# Patient Record
Sex: MALE | Race: WHITE | NOT HISPANIC OR LATINO | Employment: OTHER | URBAN - NONMETROPOLITAN AREA
[De-identification: names, ages, dates, MRNs, and addresses within clinical notes are randomized per-mention and may not be internally consistent; named-entity substitution may affect disease eponyms.]

---

## 2018-03-01 ENCOUNTER — OFFICE VISIT CONVERTED (OUTPATIENT)
Dept: FAMILY MEDICINE CLINIC | Facility: CLINIC | Age: 58
End: 2018-03-01
Attending: NURSE PRACTITIONER

## 2018-03-19 ENCOUNTER — OFFICE VISIT CONVERTED (OUTPATIENT)
Dept: FAMILY MEDICINE CLINIC | Facility: CLINIC | Age: 58
End: 2018-03-19
Attending: NURSE PRACTITIONER

## 2018-03-19 ENCOUNTER — CONVERSION ENCOUNTER (OUTPATIENT)
Dept: FAMILY MEDICINE CLINIC | Facility: CLINIC | Age: 58
End: 2018-03-19

## 2018-03-27 ENCOUNTER — OFFICE VISIT CONVERTED (OUTPATIENT)
Dept: FAMILY MEDICINE CLINIC | Facility: CLINIC | Age: 58
End: 2018-03-27
Attending: NURSE PRACTITIONER

## 2018-05-30 ENCOUNTER — OFFICE VISIT CONVERTED (OUTPATIENT)
Dept: FAMILY MEDICINE CLINIC | Facility: CLINIC | Age: 58
End: 2018-05-30
Attending: NURSE PRACTITIONER

## 2018-09-11 ENCOUNTER — CONVERSION ENCOUNTER (OUTPATIENT)
Dept: FAMILY MEDICINE CLINIC | Facility: CLINIC | Age: 58
End: 2018-09-11

## 2018-09-11 ENCOUNTER — OFFICE VISIT CONVERTED (OUTPATIENT)
Dept: FAMILY MEDICINE CLINIC | Facility: CLINIC | Age: 58
End: 2018-09-11
Attending: NURSE PRACTITIONER

## 2018-10-09 ENCOUNTER — OFFICE VISIT CONVERTED (OUTPATIENT)
Dept: FAMILY MEDICINE CLINIC | Facility: CLINIC | Age: 58
End: 2018-10-09
Attending: NURSE PRACTITIONER

## 2019-04-02 ENCOUNTER — HOSPITAL ENCOUNTER (OUTPATIENT)
Dept: GENERAL RADIOLOGY | Facility: HOSPITAL | Age: 59
Discharge: HOME | End: 2019-04-02
Attending: FAMILY MEDICINE

## 2019-04-02 ENCOUNTER — OFFICE VISIT CONVERTED (OUTPATIENT)
Dept: FAMILY MEDICINE CLINIC | Facility: CLINIC | Age: 59
End: 2019-04-02
Attending: FAMILY MEDICINE

## 2019-05-28 ENCOUNTER — OFFICE VISIT CONVERTED (OUTPATIENT)
Dept: SURGERY | Facility: CLINIC | Age: 59
End: 2019-05-28
Attending: SURGERY

## 2019-06-03 ENCOUNTER — HOSPITAL ENCOUNTER (OUTPATIENT)
Dept: GASTROENTEROLOGY | Facility: HOSPITAL | Age: 59
Setting detail: HOSPITAL OUTPATIENT SURGERY
Discharge: HOME | End: 2019-06-03
Attending: SURGERY

## 2019-06-24 ENCOUNTER — OFFICE VISIT CONVERTED (OUTPATIENT)
Dept: SURGERY | Facility: CLINIC | Age: 59
End: 2019-06-24
Attending: SURGERY

## 2021-03-02 ENCOUNTER — OFFICE VISIT CONVERTED (OUTPATIENT)
Dept: FAMILY MEDICINE CLINIC | Facility: CLINIC | Age: 61
End: 2021-03-02
Attending: FAMILY MEDICINE

## 2021-05-14 VITALS
TEMPERATURE: 98 F | OXYGEN SATURATION: 98 % | BODY MASS INDEX: 31.59 KG/M2 | HEART RATE: 56 BPM | DIASTOLIC BLOOD PRESSURE: 81 MMHG | HEIGHT: 67 IN | SYSTOLIC BLOOD PRESSURE: 162 MMHG | RESPIRATION RATE: 18 BRPM | WEIGHT: 201.25 LBS

## 2021-05-14 NOTE — PROGRESS NOTES
Progress Note      Patient Name: Patric Villalobos   Patient ID: [id number]   Sex: Male   YOB: 1960    Primary Care Provider: Moises Lozoya DO   Referring Provider: Moises Lozoya DO    Visit Date: March 2, 2021    Provider: Moises Lozoya DO   Location: [facility]   Location Address: [address] [place] [address]  [id number]   Location Phone: [phone]          Chief Complaint  · Since December he has been having diarrhea off and on.   · Has been taking Immodium at times and helps but ends up having diarrhea again.   · Had constipation a few times but thinks it could have been from Immodium       History Of Present Illness  Patric Villalobos is a 60 year old /White male who presents for evaluation and treatment of:        Patient presents complaining of diarrhea that is more like just loose stools you interested in trying some therapy changes or may be just MiraLAX as that is not taking enough fiber like a certain done such as psyllium that may be the issue of not we could do more or do stool samples       Past Medical History  Disease Name Date Onset Notes   Arthritis --  --    Gout 06/10/2014 --    Heart Murmur 1975 --    Hyperlipemia --  --    Hypertension 06/10/2014 --    Hypertriglyceridemia 06/04/2015 --    Limb Swelling --  --    Lung disease --  --    Memory loss/Forgetfulness --  --    Night sweats --  --    Osteoarthritis 12/04/2014 --    Osteoarthritis (arthritis due to wear and tear of joints) 07/18/2016 --    Reflux --  --    Reflux Disease --  --    Right Pain: Knee-Resolved 08/03/2015 --    Seasonal allergies --  --    Tuberculosis or positive PPD test 1985 --          Past Surgical History  Procedure Name Date Notes   Colonoscopy 2014 --    Finger Surgery 1975 middle finger-tractor accident   Hand surgery, left --  --          Medication List  Name Date Started Instructions   ALLOPURINOL 100 MG TABS 100 Tablet 12/28/2020 TAKE ONE TABLET BY MOUTH 3  TIMES DAILY   Lipitor 20 mg oral tablet 09/10/2020 take 1 tablet (20 mg) by oral route once daily at bedtime for 90 days   METOPROLOL SUCC ER 25 25 Tablet 11/30/2020 TAKE ONE TABLET BY MOUTH ONCE DAILY   Vitamin D2 50,000 unit oral capsule 04/02/2019 take 1 capsule (50,000 unit) by oral route once weekly for 30 days         Allergy List  Allergen Name Date Reaction Notes   NO KNOWN DRUG ALLERGIES --  --  --        Allergies Reconciled  Family Medical History  Disease Name Relative/Age Notes   Lung Neoplasm, Malignant Mother/74   --    Cancer, Unspecified Mother/   Mother   Diabetes, unspecified type Brother/  Sister/   Sister; Brother   - No Family History of Colorectal Cancer  --    -Father's Family History Unknown  --    -Mother's Family History Unknown  --          Social History  Finding Status Start/Stop Quantity Notes   Alcohol Current every day --/-- 18-12 oz. beers and wine, consumes 4-5 beers per day   Denies illicit substance abuse --  --/-- --  --    . --  --/-- --  --    Heavy Amount of Exercise (4 or more times weekly) --  --/-- --  --    lives alone --  --/-- --  --    Recreational Drug Use Never --/-- --  no   Tobacco Former --/-- --  former smoker   Working --  --/-- --  --          Immunizations  NameDate Admin Mfg Trade Name Lot Number Route Inj VIS Given VIS Publication   [facility]d10/09/2018 [id number] 10/09/2018 02/24/2015   Comments: NDC# [id number]. Patient tolerated the injection well with no reaction after a 15 min. wait.         Review of Systems  · Constitutional  o Denies  o : fever, fatigue, weight loss, weight gain  · HENT  o Denies  o : sinus pain, nasal congestion, nasal discharge, postnasal drip  · Cardiovascular  o Denies  o : lower extremity edema, claudication, chest pressure, palpitations  · Respiratory  o Denies  o : shortness of breath, wheezing, cough, hemoptysis, dyspnea on exertion  · Gastrointestinal  o Admits  o : diarrhea  o Denies  o : nausea, vomiting,  "constipation, abdominal pain  · Integument  o Denies  o : rash, itching, skin dryness  · Psychiatric  o Denies  o : anxiety, depression      Vitals  Date Time BP Position Site L\R Cuff Size HR RR TEMP (F) WT  HT  BMI kg/m2 BSA m2 O2 Sat FR L/min FiO2 HC       03/02/2021 04:24 /81 Sitting    56 - R 18 98 201lbs 4oz 5'  7.5\" 31.05 2.09 98 %  21%    03/02/2021 04:26 /78 Sitting                       Physical Examination  · Constitutional  o Appearance  o : no acute distress, well-nourished  · Head and Face  o Head  o :   § Inspection  § : atraumatic, normocephalic  · Ears, Nose, Mouth and Throat  o Ears  o :   § External Ears  § : normal  o Nose  o :   § Intranasal Exam  § : nares patent  o Oral Cavity  o :   § Oral Mucosa  § : moist mucous membranes  · Respiratory  o Respiratory Effort  o : breathing comfortably, symmetric chest rise  o Auscultation of Lungs  o : clear to asculatation bilaterally, no wheezes, rales, or rhonchii  · Cardiovascular  o Heart  o :   § Auscultation of Heart  § : regular rate and rhythm, no murmurs, rubs, or gallops  o Peripheral Vascular System  o :   § Extremities  § : no edema  · Neurologic  o Mental Status Examination  o :   § Orientation  § : grossly oriented to person, place and time  o Gait and Station  o :   § Gait Screening  § : normal gait  · Psychiatric  o General  o : normal mood and affect          Assessment  · Diarrhea     787.91/R19.7  · Hypertension     401.9/I10         Diarrhea  Loose stool  Advised to take psyllium Metamucil daily see if improved follow-up later in a week if not and consider diet changes handouts provided    Elevated blood pressure today as well recommend following up if not better at home which is usually the case and denies any chest pain palpitations headache fever or other     Problems Reconciled  Plan  · Orders  o ACO-39: Current medications updated and reviewed (1159F, ) - 401.9/I10, 787.91/R19.7 - " 03/02/2021  · Medications  o Medications have been Reconciled  o Transition of Care or Provider Policy  · Instructions  o Patient is taking medications as prescribed and doing well.   o Take all medications as prescribed/directed.  o Patient was educated/instructed on their diagnosis, treatment and medications prior to discharge from the clinic today.  o Risks, benefits, and alternatives were discussed with the patient. The patient is aware of risks associated with:  o Chronic conditions reviewed and taken into consideration for today's treatment plan.  o Electronically Identified Patient Education Materials Provided Electronically  · Disposition  o Call or Return if symptoms worsen or persist.  o as scheduled  o Follow up later in week if no better            Electronically Signed by: Mioses Lozoya DO -Author on March 7, 2021 04:23:29 PM

## 2021-05-15 VITALS
WEIGHT: 214 LBS | HEART RATE: 84 BPM | DIASTOLIC BLOOD PRESSURE: 76 MMHG | BODY MASS INDEX: 33.59 KG/M2 | SYSTOLIC BLOOD PRESSURE: 143 MMHG | OXYGEN SATURATION: 98 % | RESPIRATION RATE: 20 BRPM | TEMPERATURE: 97.1 F | HEIGHT: 67 IN

## 2021-05-15 VITALS — WEIGHT: 210 LBS | RESPIRATION RATE: 14 BRPM | BODY MASS INDEX: 32.96 KG/M2 | HEIGHT: 67 IN

## 2021-05-15 VITALS — WEIGHT: 211 LBS | RESPIRATION RATE: 14 BRPM | BODY MASS INDEX: 33.12 KG/M2 | HEIGHT: 67 IN

## 2021-05-16 VITALS
HEART RATE: 84 BPM | BODY MASS INDEX: 31.48 KG/M2 | RESPIRATION RATE: 20 BRPM | WEIGHT: 200.56 LBS | OXYGEN SATURATION: 98 % | DIASTOLIC BLOOD PRESSURE: 95 MMHG | TEMPERATURE: 98 F | HEIGHT: 67 IN | SYSTOLIC BLOOD PRESSURE: 138 MMHG

## 2021-05-16 VITALS
TEMPERATURE: 98.2 F | HEART RATE: 81 BPM | WEIGHT: 205.44 LBS | OXYGEN SATURATION: 95 % | BODY MASS INDEX: 32.25 KG/M2 | HEIGHT: 67 IN | SYSTOLIC BLOOD PRESSURE: 133 MMHG | RESPIRATION RATE: 20 BRPM | DIASTOLIC BLOOD PRESSURE: 73 MMHG

## 2021-05-16 VITALS
OXYGEN SATURATION: 95 % | TEMPERATURE: 96.2 F | WEIGHT: 205.25 LBS | DIASTOLIC BLOOD PRESSURE: 75 MMHG | SYSTOLIC BLOOD PRESSURE: 136 MMHG | RESPIRATION RATE: 20 BRPM | HEART RATE: 84 BPM

## 2021-05-16 VITALS
WEIGHT: 203.25 LBS | DIASTOLIC BLOOD PRESSURE: 76 MMHG | BODY MASS INDEX: 31.9 KG/M2 | RESPIRATION RATE: 20 BRPM | SYSTOLIC BLOOD PRESSURE: 126 MMHG | HEART RATE: 72 BPM | TEMPERATURE: 97.2 F | HEIGHT: 67 IN | OXYGEN SATURATION: 98 %

## 2021-05-16 VITALS
BODY MASS INDEX: 23.19 KG/M2 | OXYGEN SATURATION: 96 % | TEMPERATURE: 97.8 F | WEIGHT: 147.75 LBS | DIASTOLIC BLOOD PRESSURE: 92 MMHG | RESPIRATION RATE: 20 BRPM | HEART RATE: 78 BPM | SYSTOLIC BLOOD PRESSURE: 142 MMHG | HEIGHT: 67 IN

## 2021-05-16 VITALS
TEMPERATURE: 97.6 F | WEIGHT: 202.56 LBS | OXYGEN SATURATION: 98 % | SYSTOLIC BLOOD PRESSURE: 147 MMHG | HEART RATE: 74 BPM | BODY MASS INDEX: 31.79 KG/M2 | RESPIRATION RATE: 20 BRPM | HEIGHT: 67 IN | DIASTOLIC BLOOD PRESSURE: 88 MMHG

## 2022-08-02 ENCOUNTER — OFFICE VISIT (OUTPATIENT)
Dept: NEUROLOGY | Facility: CLINIC | Age: 62
End: 2022-08-02

## 2022-08-02 VITALS
BODY MASS INDEX: 30.28 KG/M2 | HEART RATE: 89 BPM | HEIGHT: 68 IN | DIASTOLIC BLOOD PRESSURE: 74 MMHG | WEIGHT: 199.8 LBS | SYSTOLIC BLOOD PRESSURE: 142 MMHG

## 2022-08-02 DIAGNOSIS — R20.2 PARESTHESIA: Primary | ICD-10-CM

## 2022-08-02 DIAGNOSIS — M79.644 FINGER PAIN, RIGHT: ICD-10-CM

## 2022-08-02 PROCEDURE — 99204 OFFICE O/P NEW MOD 45 MIN: CPT | Performed by: NURSE PRACTITIONER

## 2022-08-02 RX ORDER — LOSARTAN POTASSIUM 25 MG/1
25 TABLET ORAL DAILY
COMMUNITY

## 2022-08-02 RX ORDER — TRAZODONE HYDROCHLORIDE 50 MG/1
50 TABLET ORAL DAILY
COMMUNITY

## 2022-08-02 RX ORDER — ATORVASTATIN CALCIUM 40 MG/1
40 TABLET, FILM COATED ORAL DAILY
COMMUNITY

## 2022-08-02 RX ORDER — ASPIRIN 81 MG/1
81 TABLET ORAL DAILY
COMMUNITY

## 2022-08-02 RX ORDER — ALLOPURINOL 100 MG/1
100 TABLET ORAL DAILY
COMMUNITY
Start: 2021-04-01

## 2022-08-02 NOTE — PROGRESS NOTES
"Chief Complaint  Numbness    Subjective          Patric Villalobos presents to [facility]OLOGY [facility]UR[facility]URGERY  States he's been having paresthesia and burning in the right first digit.  Radiates up the forearm some.  States he is a  by trade and has done that for 35-40 years.  States that pain is concentrated in knuckle area of first digit.        Objective   Vital Signs:   /74   Pulse 89   Ht 171.5 cm (67.5\")   Wt 90.6 kg (199 lb 12.8 oz)   BMI 30.83 kg/m²     Physical Exam  Neurological:      Mental Status: He is oriented to person, place, and time.      Gait: Gait is intact.      Deep Tendon Reflexes: Strength normal.      Reflex Scores:       Bicep reflexes are 2+ on the right side and 2+ on the left side.       Brachioradialis reflexes are 2+ on the right side and 2+ on the left side.       Patellar reflexes are 2+ on the right side and 2+ on the left side.       Neurologic Exam     Mental Status   Oriented to person, place, and time.     Cranial Nerves   Cranial nerves II through XII intact.     Motor Exam   Muscle bulk: normal    Strength   Strength 5/5 throughout.     Sensory Exam   Decreased pinprick to right second and third digits.      Gait, Coordination, and Reflexes     Gait  Gait: normal    Reflexes   Right brachioradialis: 2+  Left brachioradialis: 2+  Right biceps: 2+  Left biceps: 2+  Right patellar: 2+  Left patellar: 2+       Result Review :               Assessment and Plan    Diagnoses and all orders for this visit:    1. Paresthesia (Primary)  -     XR Finger 2+ View Right; Future  -     EMG & Nerve Conduction Test; Future    2. Finger pain, right  Assessment & Plan:  Will order xray and EMG to further evaluate pain in right second digit that radiates to the forearm.  Differential diagnosis includes arthritis and right carpal tunnel syndrome.      Orders:  -     XR Finger 2+ View Right; Future  -     EMG & Nerve Conduction Test; Future      Follow Up "   Return in about 3 months (around 11/2/2022).  Patient was given instructions and counseling regarding his condition or for health maintenance advice. Please see specific information pulled into the AVS if appropriate.

## 2022-08-03 PROBLEM — M79.644 FINGER PAIN, RIGHT: Status: ACTIVE | Noted: 2022-08-03

## 2022-08-03 PROBLEM — R20.2 PARESTHESIA: Status: ACTIVE | Noted: 2022-08-03

## 2022-08-03 NOTE — ASSESSMENT & PLAN NOTE
Will order xray and EMG to further evaluate pain in right second digit that radiates to the forearm.  Differential diagnosis includes arthritis and right carpal tunnel syndrome.

## 2022-10-12 ENCOUNTER — HOSPITAL ENCOUNTER (OUTPATIENT)
Dept: GENERAL RADIOLOGY | Facility: HOSPITAL | Age: 62
Discharge: HOME | End: 2022-10-12
Admitting: NURSE PRACTITIONER

## 2022-10-12 DIAGNOSIS — M79.644 FINGER PAIN, RIGHT: ICD-10-CM

## 2022-10-12 DIAGNOSIS — R20.2 PARESTHESIA: ICD-10-CM

## 2022-10-12 PROCEDURE — 73140 X-RAY EXAM OF FINGER(S): CPT

## 2022-10-24 ENCOUNTER — PROCEDURE VISIT (OUTPATIENT)
Dept: NEUROLOGY | Facility: CLINIC | Age: 62
End: 2022-10-24

## 2022-10-24 VITALS
HEIGHT: 68 IN | SYSTOLIC BLOOD PRESSURE: 166 MMHG | HEART RATE: 50 BPM | DIASTOLIC BLOOD PRESSURE: 87 MMHG | BODY MASS INDEX: 29.33 KG/M2 | WEIGHT: 193.5 LBS

## 2022-10-24 DIAGNOSIS — M79.644 FINGER PAIN, RIGHT: ICD-10-CM

## 2022-10-24 DIAGNOSIS — R20.2 PARESTHESIA: ICD-10-CM

## 2022-10-24 DIAGNOSIS — G56.03 BILATERAL CARPAL TUNNEL SYNDROME: Primary | ICD-10-CM

## 2022-10-24 PROCEDURE — 95910 NRV CNDJ TEST 7-8 STUDIES: CPT | Performed by: PSYCHIATRY & NEUROLOGY

## 2022-10-24 PROCEDURE — 99202 OFFICE O/P NEW SF 15 MIN: CPT | Performed by: PSYCHIATRY & NEUROLOGY

## 2022-10-24 RX ORDER — ATORVASTATIN CALCIUM 20 MG/1
20 TABLET, FILM COATED ORAL DAILY
COMMUNITY
Start: 2022-07-18

## 2022-10-24 RX ORDER — LOSARTAN POTASSIUM 50 MG/1
50 TABLET ORAL DAILY
COMMUNITY

## 2022-10-24 NOTE — PROGRESS NOTES
"Chief Complaint  No chief complaint on file.    Subjective          Patric Villalobos is a 62 y.o. male who presents to [facility]UROLOGY & NEUROSURGERY  History of Present Illness  60-year-old man evaluated for pain in his right index finger in the metacarpophalangeal joint as well as the phalangeal joint.  It happens about 4 times a week and it depends on what he is caring.  He states that carrying a 50 to 60 pound 5 gallon paint container makes it worse.  He states that he does not get the numbness in the forearm medially unless there is pain in his index finger joints.  It can last for 20 minutes at a time.  He does not have any numbness and tingling in his hands when he wakes up in the morning, do activities of daily living such as driving, holding his phone or when he is working such as painting.  Objective   Vital Signs:   /87   Pulse 50   Ht 171.5 cm (67.5\")   Wt 87.8 kg (193 lb 8 oz)   BMI 29.86 kg/m²     Physical Exam   There is no weakness of the upper extremity on individual muscle testing.  Reflexes are symmetrical in the biceps, triceps, brachioradialis.  Phalen sign is negative.        Assessment and Plan  Diagnoses and all orders for this visit:    1. Bilateral carpal tunnel syndrome (Primary)  Assessment & Plan:  Nerve conduction study is abnormal and shows electrophysiologic evidence for mild bilateral carpal tunnel syndrome.  Clinically he is asymptomatic.  I discussed with him that his index finger joint pain is most likely secondary to osteoarthritis.  The numbness in the forearm medially is most likely referred symptomatology.      2. Paresthesia  -     EMG & Nerve Conduction Test    3. Finger pain, right  Assessment & Plan:  The joint pain in his index finger involving the metacarpophalangeal joint as well as the phalangeal joint is most likely secondary to osteoarthritis.    Orders:  -     EMG & Nerve Conduction Test       Nerve Conduction Study:  7 nerves "     EMG:  Not done    Total time spent with the patient and coordinating patient care was 15 minutes.    Follow Up  No follow-ups on file.  Patient was given instructions and counseling regarding his condition or for health maintenance advice. Please see specific information pulled into the AVS if appropriate.

## 2022-10-24 NOTE — ASSESSMENT & PLAN NOTE
Nerve conduction study is abnormal and shows electrophysiologic evidence for mild bilateral carpal tunnel syndrome.  Clinically he is asymptomatic.  I discussed with him that his index finger joint pain is most likely secondary to osteoarthritis.  The numbness in the forearm medially is most likely referred symptomatology.

## 2022-10-24 NOTE — ASSESSMENT & PLAN NOTE
The joint pain in his index finger involving the metacarpophalangeal joint as well as the phalangeal joint is most likely secondary to osteoarthritis.

## 2022-11-03 ENCOUNTER — OFFICE VISIT (OUTPATIENT)
Dept: NEUROLOGY | Facility: CLINIC | Age: 62
End: 2022-11-03

## 2022-11-03 VITALS
WEIGHT: 191.6 LBS | DIASTOLIC BLOOD PRESSURE: 83 MMHG | SYSTOLIC BLOOD PRESSURE: 166 MMHG | BODY MASS INDEX: 29.04 KG/M2 | HEIGHT: 68 IN | HEART RATE: 54 BPM

## 2022-11-03 DIAGNOSIS — M19.041 OSTEOARTHRITIS OF RIGHT HAND, UNSPECIFIED OSTEOARTHRITIS TYPE: Primary | ICD-10-CM

## 2022-11-03 PROCEDURE — 99214 OFFICE O/P EST MOD 30 MIN: CPT | Performed by: NURSE PRACTITIONER

## 2022-11-07 PROBLEM — M19.041 OSTEOARTHRITIS OF RIGHT HAND: Status: ACTIVE | Noted: 2022-11-07

## 2022-11-07 NOTE — ASSESSMENT & PLAN NOTE
EMG normal.  Symptoms likely secondary to osteoarthritis.  Recommended OTC topicals for pain and to discuss further with PCP.

## 2022-11-07 NOTE — PROGRESS NOTES
"Chief Complaint  Numbness    Subjective          Patric Villalobos presents to [facility] NEUROLOGY & NEUROSURGERY  History of Present Illness  Following up for EMG results.  Continuing to experience pain and burning in right first digit.     Interval History:   States he's been having paresthesia and burning in the right first digit.  Radiates up the forearm some.  States he is a  by trade and has done that for 35-40 years.  States that pain is concentrated in knuckle area of first digit.        Objective   Vital Signs:   /83   Pulse 54   Ht 171.5 cm (67.5\")   Wt 86.9 kg (191 lb 9.6 oz)   BMI 29.57 kg/m²     Physical Exam  Neurological:      Mental Status: He is oriented to person, place, and time.      Cranial Nerves: Cranial nerves 2-12 are intact.      Motor: Motor strength is normal.      Gait: Gait is intact.      Deep Tendon Reflexes:      Reflex Scores:       Bicep reflexes are 2+ on the right side and 2+ on the left side.       Brachioradialis reflexes are 2+ on the right side and 2+ on the left side.       Patellar reflexes are 2+ on the right side and 2+ on the left side.       Neurologic Exam     Mental Status   Oriented to person, place, and time.     Cranial Nerves   Cranial nerves II through XII intact.     Motor Exam   Muscle bulk: normal    Strength   Strength 5/5 throughout.     Sensory Exam   Decreased pinprick to right second and third digits.      Gait, Coordination, and Reflexes     Gait  Gait: normal    Reflexes   Right brachioradialis: 2+  Left brachioradialis: 2+  Right biceps: 2+  Left biceps: 2+  Right patellar: 2+  Left patellar: 2+       Result Review :             EMG: Normal     Assessment and Plan    Diagnoses and all orders for this visit:    1. Osteoarthritis of right hand, unspecified osteoarthritis type (Primary)  Assessment & Plan:  EMG normal.  Symptoms likely secondary to osteoarthritis.  Recommended OTC topicals for pain and to discuss " further with PCP.        Follow Up   Return if symptoms worsen or fail to improve.  Patient was given instructions and counseling regarding his condition or for health maintenance advice. Please see specific information pulled into the AVS if appropriate.

## 2023-03-02 ENCOUNTER — TRANSCRIBE ORDERS (OUTPATIENT)
Dept: ADMINISTRATIVE | Facility: HOSPITAL | Age: 63
End: 2023-03-02
Payer: COMMERCIAL

## 2023-03-02 ENCOUNTER — HOSPITAL ENCOUNTER (OUTPATIENT)
Dept: CARDIOLOGY | Facility: HOSPITAL | Age: 63
Discharge: HOME | End: 2023-03-02
Admitting: NURSE PRACTITIONER
Payer: COMMERCIAL

## 2023-03-02 DIAGNOSIS — I10 ESSENTIAL HYPERTENSION: ICD-10-CM

## 2023-03-02 DIAGNOSIS — I10 ESSENTIAL HYPERTENSION: Primary | ICD-10-CM

## 2023-03-02 PROCEDURE — 93005 ELECTROCARDIOGRAM TRACING: CPT | Performed by: NURSE PRACTITIONER

## 2023-03-02 PROCEDURE — 93010 ELECTROCARDIOGRAM REPORT: CPT | Performed by: INTERNAL MEDICINE

## 2023-03-04 LAB — QT INTERVAL: 430 MS

## 2024-10-16 ENCOUNTER — PREP FOR SURGERY (OUTPATIENT)
Dept: OTHER | Facility: HOSPITAL | Age: 64
End: 2024-10-16
Payer: COMMERCIAL

## 2024-10-16 ENCOUNTER — OFFICE VISIT (OUTPATIENT)
Dept: SURGERY | Facility: CLINIC | Age: 64
End: 2024-10-16
Payer: COMMERCIAL

## 2024-10-16 VITALS
HEART RATE: 73 BPM | DIASTOLIC BLOOD PRESSURE: 79 MMHG | BODY MASS INDEX: 32.89 KG/M2 | SYSTOLIC BLOOD PRESSURE: 151 MMHG | WEIGHT: 217 LBS | HEIGHT: 68 IN

## 2024-10-16 DIAGNOSIS — Z12.11 SCREENING FOR MALIGNANT NEOPLASM OF COLON: Primary | ICD-10-CM

## 2024-10-16 DIAGNOSIS — Z86.0100 HISTORY OF COLONIC POLYPS: ICD-10-CM

## 2024-10-16 RX ORDER — SODIUM CHLORIDE 0.9 % (FLUSH) 0.9 %
3 SYRINGE (ML) INJECTION EVERY 12 HOURS SCHEDULED
OUTPATIENT
Start: 2024-10-16

## 2024-10-16 RX ORDER — SODIUM CHLORIDE 9 MG/ML
40 INJECTION, SOLUTION INTRAVENOUS AS NEEDED
OUTPATIENT
Start: 2024-11-26 | End: 2024-11-26

## 2024-10-16 RX ORDER — EZETIMIBE 10 MG/1
TABLET ORAL
COMMUNITY
Start: 2024-10-15

## 2024-10-16 RX ORDER — SODIUM CHLORIDE 0.9 % (FLUSH) 0.9 %
10 SYRINGE (ML) INJECTION AS NEEDED
OUTPATIENT
Start: 2024-10-16

## 2024-10-16 RX ORDER — PITAVASTATIN CALCIUM 1.04 MG/1
1 TABLET, FILM COATED ORAL DAILY
COMMUNITY
Start: 2024-10-15 | End: 2024-11-14

## 2024-10-16 RX ORDER — AMLODIPINE BESYLATE 5 MG/1
TABLET ORAL
COMMUNITY
Start: 2024-10-01

## 2024-10-16 RX ORDER — PEG-3350, SODIUM SULFATE, SODIUM CHLORIDE, POTASSIUM CHLORIDE, SODIUM ASCORBATE AND ASCORBIC ACID 7.5-2.691G
1000 KIT ORAL ONCE
Qty: 1000 ML | Refills: 0 | Status: SHIPPED | OUTPATIENT
Start: 2024-10-16 | End: 2024-10-16

## 2024-10-16 NOTE — PROGRESS NOTES
Chief Complaint: Colonoscopy    Subjective      Colonoscopy consultation       History of Present Illness  Patric Villalobos is a 64 y.o. male presents to [facility] GENERAL SURGERY for colonoscopy consultation.    Patient presents today on referral from Dr. Wally Lozoya.    Patient presents today without complaints for screening colonoscopy.  He denies any abdominal pain, change in bowel habit, or rectal bleeding.  Denies any family history of colorectal cancer.  Denies any family history of colorectal cancer.    Admits to GLORIA.  Does not use a CPAP.    Denies any cardiac issues.    Denies taking a GLP-1 receptors.    5/19: colonoscopy (Darell):  normal colon.    5/14: Colonoscopy (Yinka): Transverse- tubular adenoma; Sigmoid- tubular adenoma; diverticulosis; hemorrhoids.     Objective     Past Medical History:   Diagnosis Date    Gout     Heart murmur     Hypertension     Shingles     Sleep apnea        Past Surgical History:   Procedure Laterality Date    FINGER SURGERY         Outpatient Medications Marked as Taking for the 10/16/24 encounter (Office Visit) with Cady Slaughter, APRANMOL   Medication Sig Dispense Refill    allopurinol (ZYLOPRIM) 100 MG tablet Take 1 tablet by mouth Daily.      amLODIPine (NORVASC) 5 MG tablet       aspirin 81 MG EC tablet Take 1 tablet by mouth Daily.      Cetirizine HCl (ZYRTEC PO) Zyrtec      Diclofenac Sodium (VOLTAREN) 1 % gel gel diclofenac 1 % topical gel      ezetimibe (ZETIA) 10 MG tablet       losartan (COZAAR) 25 MG tablet Take 1 tablet by mouth Daily.      Omeprazole Magnesium (PRILOSEC PO) Prilosec OTC   one tablet as needed      pitavastatin calcium (LIVALO) 1 MG tablet tablet Take 1 tablet by mouth Daily.      POTASSIUM PO potassium 99 mg oral tablet take 1 tablet by oral route daily   Suspended      traZODone (DESYREL) 50 MG tablet Take 1 tablet by mouth Daily.      vitamin E 100 UNIT capsule vitamin E 100 unit oral capsule take 1 capsule by oral route  "daily   Suspended         No Known Allergies     Family History   Problem Relation Age of Onset    Lung cancer Mother     Diabetes Sister     Diabetes Brother        Social History     Socioeconomic History    Marital status:    Tobacco Use    Smoking status: Former     Types: Cigarettes     Passive exposure: Past    Smokeless tobacco: Never   Vaping Use    Vaping status: Never Used   Substance and Sexual Activity    Alcohol use: Yes    Drug use: Yes     Types: Marijuana    Sexual activity: Defer       Review of Systems   Constitutional:  Negative for chills and fever.   Gastrointestinal:  Negative for abdominal distention, abdominal pain, anal bleeding, blood in stool, constipation, diarrhea and rectal pain.        Vital Signs:   /79 (BP Location: Right arm, Patient Position: Sitting, Cuff Size: Adult)   Pulse 73   Ht 171.5 cm (67.5\")   Wt 98.4 kg (217 lb)   BMI 33.49 kg/m²      Physical Exam  Vitals and nursing note reviewed.   Constitutional:       General: He is not in acute distress.     Appearance: Normal appearance. He is not ill-appearing.   HENT:      Head: Normocephalic and atraumatic.   Cardiovascular:      Rate and Rhythm: Normal rate.   Pulmonary:      Effort: Pulmonary effort is normal.      Breath sounds: No stridor.   Abdominal:      Palpations: Abdomen is soft.      Tenderness: There is no guarding.   Musculoskeletal:         General: No deformity. Normal range of motion.   Skin:     General: Skin is warm and dry.      Coloration: Skin is not jaundiced.   Neurological:      General: No focal deficit present.      Mental Status: He is alert and oriented to person, place, and time.   Psychiatric:         Mood and Affect: Mood normal.         Thought Content: Thought content normal.          Result Review :          []  Laboratory  []  Radiology  []  Pathology  []  Microbiology  []  EKG/Telemetry   []  Cardiology/Vascular   []  Old records  I spent 15 minutes caring for Patric wolfe " colonoscopy with Dr. Hoskins on on this date of service. This time includes time spent by me in the following activities: reviewing tests, obtaining and/or reviewing a separately obtained history, performing a medically appropriate examination and/or evaluation, ordering medications, tests, or procedures, and documenting information in the medical record        Assessment and Plan    Diagnoses and all orders for this visit:    1. Screening for malignant neoplasm of colon (Primary)    2. History of colonic polyps    Other orders  -     PEG-KCl-NaCl-NaSulf-Na Asc-C (MOVIPREP) 100 g reconstituted solution powder; Take 1,000 mL by mouth 1 (One) Time for 1 dose.  Dispense: 1000 mL; Refill: 0        Follow Up   Return for Schedule colonoscopy with Dr. Hoskins on 11/26/2024 [facility].    Hospital arrival time: 12:30.    Possible risks/complications, benefits, and alternatives to surgical or invasive procedures have been explained to patient and/or legal guardian.    Patient has been evaluated and can tolerate anesthesia and/or sedation. Risks, benefits, and alternatives to anesthesia and sedation have been explained to the patient and/or legal guardian. Patient verbalizes understanding and is willing to proceed with the above plan.     Patient was given instructions and counseling regarding his condition or for health maintenance advice. Please see specific information pulled into the AVS if appropriate.      Thank you for allowing me to participate in the care of this patient. Please call with questions or concerns.

## 2024-11-18 NOTE — PRE-PROCEDURE INSTRUCTIONS
"PAT call attempted.  No answer.  Detailed message with date and arrival time of 1230 given.  Instructed that arrival time is not procedure time but allows time to prepare for procedure. Come to entrance \"C\"; must have adult  for transportation home; may have two visitors; however, children under 12 must remain in waiting area; instructed on diet/clear liquids/NPO/bowel prep, if needed; may take normal meds two hours prior to arrival time except for blood thinners, antidiabetics, diuretics, and weight loss meds.  Instructed to return call to confirm receipt of instructions and for any questions.  "

## 2024-11-25 ENCOUNTER — ANESTHESIA EVENT (OUTPATIENT)
Dept: GASTROENTEROLOGY | Facility: HOSPITAL | Age: 64
End: 2024-11-25
Payer: COMMERCIAL

## 2024-11-25 NOTE — ANESTHESIA PREPROCEDURE EVALUATION
Anesthesia Evaluation     Nursing notes reviewed   NPO Solid Status: > 8 hours  NPO Liquid Status: > 4 hours           Airway   Mallampati: I  TM distance: >3 FB  Neck ROM: full  No difficulty expected  Dental    (+) edentulous    Pulmonary     breath sounds clear to auscultation  (+) ,sleep apnea (currently does not have his cpap)  Cardiovascular - normal exam  Exercise tolerance: good (4-7 METS)    ECG reviewed  Rhythm: regular  Rate: normal    (+) hypertension well controlled, valvular problems/murmurs murmur    ROS comment: EKG 3/2/23  HEART RATE= 65  bpm  RR Interval= 924  ms  OH Interval= 172  ms  P Horizontal Axis= 11  deg  P Front Axis= 46  deg  QRSD Interval= 94  ms  QT Interval= 430  ms  QRS Axis= 42  deg  T Wave Axis= 87  deg  - BORDERLINE ECG -  Sinus rhythm  Borderline T wave abnormalities  No previous ECG available for comparison  Electronically Signed By: Horace Walton ([facility] 04-Mar-2023 15:55:30  Date and Time of Study: 2023-03-02 17:45:43      Neuro/Psych  (+) numbness  GI/Hepatic/Renal/Endo      Musculoskeletal     Abdominal    Substance History      OB/GYN          Other   arthritis,     ROS/Med Hx Other: Screening, hx polyps     EKG 03/02/23: HR 65,   Sinus rhythm  Borderline T wave abnormalities  No previous ECG available for comparison                    Anesthesia Plan    ASA 2     general   total IV anesthesia  (Total IV Anesthesia    Patient understands anesthesia not responsible for dental damage.  )  intravenous induction     Anesthetic plan, risks, benefits, and alternatives have been provided, discussed and informed consent has been obtained with: patient.  Pre-procedure education provided  Plan discussed with CRNA.      CODE STATUS:

## 2024-11-26 ENCOUNTER — HOSPITAL ENCOUNTER (OUTPATIENT)
Facility: HOSPITAL | Age: 64
Setting detail: HOSPITAL OUTPATIENT SURGERY
Discharge: HOME | End: 2024-11-26
Attending: SURGERY | Admitting: SURGERY
Payer: COMMERCIAL

## 2024-11-26 ENCOUNTER — ANESTHESIA (OUTPATIENT)
Dept: GASTROENTEROLOGY | Facility: HOSPITAL | Age: 64
End: 2024-11-26
Payer: COMMERCIAL

## 2024-11-26 VITALS
HEART RATE: 70 BPM | RESPIRATION RATE: 16 BRPM | OXYGEN SATURATION: 98 % | BODY MASS INDEX: 32.66 KG/M2 | WEIGHT: 211.64 LBS | SYSTOLIC BLOOD PRESSURE: 120 MMHG | DIASTOLIC BLOOD PRESSURE: 79 MMHG | TEMPERATURE: 97.7 F

## 2024-11-26 DIAGNOSIS — Z12.11 SCREENING FOR MALIGNANT NEOPLASM OF COLON: ICD-10-CM

## 2024-11-26 DIAGNOSIS — Z86.0100 HISTORY OF COLONIC POLYPS: ICD-10-CM

## 2024-11-26 PROCEDURE — 25010000002 LIDOCAINE PF 2% 2 % SOLUTION: Performed by: NURSE ANESTHETIST, CERTIFIED REGISTERED

## 2024-11-26 PROCEDURE — 25010000002 PROPOFOL 10 MG/ML EMULSION: Performed by: NURSE ANESTHETIST, CERTIFIED REGISTERED

## 2024-11-26 PROCEDURE — 25810000003 LACTATED RINGERS PER 1000 ML: Performed by: NURSE ANESTHETIST, CERTIFIED REGISTERED

## 2024-11-26 PROCEDURE — 88305 TISSUE EXAM BY PATHOLOGIST: CPT | Performed by: SURGERY

## 2024-11-26 DEVICE — DEV CLIP ENDO RESOLUTION360 CONTRL ROT 235CM: Type: IMPLANTABLE DEVICE | Site: SIGMOID COLON | Status: FUNCTIONAL

## 2024-11-26 RX ORDER — SODIUM CHLORIDE 0.9 % (FLUSH) 0.9 %
3 SYRINGE (ML) INJECTION EVERY 12 HOURS SCHEDULED
Status: DISCONTINUED | OUTPATIENT
Start: 2024-11-26 | End: 2024-11-26 | Stop reason: HOSPADM

## 2024-11-26 RX ORDER — PROPOFOL 10 MG/ML
VIAL (ML) INTRAVENOUS AS NEEDED
Status: DISCONTINUED | OUTPATIENT
Start: 2024-11-26 | End: 2024-11-26

## 2024-11-26 RX ORDER — LIDOCAINE HYDROCHLORIDE 20 MG/ML
INJECTION, SOLUTION EPIDURAL; INFILTRATION; INTRACAUDAL; PERINEURAL AS NEEDED
Status: DISCONTINUED | OUTPATIENT
Start: 2024-11-26 | End: 2024-11-26 | Stop reason: SURG

## 2024-11-26 RX ORDER — PROPOFOL 10 MG/ML
VIAL (ML) INTRAVENOUS AS NEEDED
Status: DISCONTINUED | OUTPATIENT
Start: 2024-11-26 | End: 2024-11-26 | Stop reason: SURG

## 2024-11-26 RX ORDER — SODIUM CHLORIDE, SODIUM LACTATE, POTASSIUM CHLORIDE, CALCIUM CHLORIDE 600; 310; 30; 20 MG/100ML; MG/100ML; MG/100ML; MG/100ML
INJECTION, SOLUTION INTRAVENOUS CONTINUOUS PRN
Status: DISCONTINUED | OUTPATIENT
Start: 2024-11-26 | End: 2024-11-26 | Stop reason: SURG

## 2024-11-26 RX ORDER — SODIUM CHLORIDE, SODIUM LACTATE, POTASSIUM CHLORIDE, CALCIUM CHLORIDE 600; 310; 30; 20 MG/100ML; MG/100ML; MG/100ML; MG/100ML
30 INJECTION, SOLUTION INTRAVENOUS CONTINUOUS
Status: DISCONTINUED | OUTPATIENT
Start: 2024-11-26 | End: 2024-11-26 | Stop reason: HOSPADM

## 2024-11-26 RX ORDER — SODIUM CHLORIDE 0.9 % (FLUSH) 0.9 %
10 SYRINGE (ML) INJECTION AS NEEDED
Status: DISCONTINUED | OUTPATIENT
Start: 2024-11-26 | End: 2024-11-26 | Stop reason: HOSPADM

## 2024-11-26 RX ORDER — SODIUM CHLORIDE 9 MG/ML
40 INJECTION, SOLUTION INTRAVENOUS AS NEEDED
Status: DISCONTINUED | OUTPATIENT
Start: 2024-11-26 | End: 2024-11-26 | Stop reason: HOSPADM

## 2024-11-26 RX ADMIN — PROPOFOL 100 MG: 10 INJECTION, EMULSION INTRAVENOUS at 13:42

## 2024-11-26 RX ADMIN — SODIUM CHLORIDE, POTASSIUM CHLORIDE, SODIUM LACTATE AND CALCIUM CHLORIDE: 600; 310; 30; 20 INJECTION, SOLUTION INTRAVENOUS at 13:42

## 2024-11-26 RX ADMIN — LIDOCAINE HYDROCHLORIDE 100 MG: 20 INJECTION, SOLUTION INTRAVENOUS at 13:43

## 2024-11-26 RX ADMIN — PROPOFOL 200 MCG/KG/MIN: 10 INJECTION, EMULSION INTRAVENOUS at 13:45

## 2024-11-26 RX ADMIN — PROPOFOL 50 MG: 10 INJECTION, EMULSION INTRAVENOUS at 13:49

## 2024-11-26 NOTE — ANESTHESIA POSTPROCEDURE EVALUATION
Patient: Patric Villalobos    Procedure Summary       Date: 11/26/24 Room / Location: [facility] ENDOSCOPY 4 / [facility] ENDOSCOPY    Anesthesia Start: 1338 Anesthesia Stop: 1413    Procedure: COLONOSCOPY WITH COLD SNARE POLYPECTOMY AND CLIPX1 Diagnosis:       Screening for malignant neoplasm of colon      History of colonic polyps      (Screening for malignant neoplasm of colon [Z12.11])      (History of colonic polyps [Z86.0100])    Surgeons: Matias Hoskins MD Provider: Bessie Tse CRNA    Anesthesia Type: general ASA Status: 2            Anesthesia Type: general    Vitals  Vitals Value Taken Time   /79 11/26/24 1427   Temp 36.5 °C (97.7 °F) 11/26/24 1427   Pulse 67 11/26/24 1429   Resp 16 11/26/24 1427   SpO2 96 % 11/26/24 1429   Vitals shown include unfiled device data.        Post Anesthesia Care and Evaluation    Post-procedure mental status: acceptable.  Pain management: satisfactory to patient    Airway patency: patent  Anesthetic complications: No anesthetic complications    Cardiovascular status: acceptable  Respiratory status: acceptable    Comments: Per chart review

## 2024-11-26 NOTE — H&P
General Surgery/Colorectal Surgery Note    Patient Name:  Patric Villalobos  YOB: 1960  [id number]    Referring Provider: Cady Slaughter APRN      Patient Care Team:  Nicole Slater APRN as PCP - General (Nurse Practitioner)    Subjective .     History of present illness:    HPI    referral from Dr. Wally Lozoya.     Patient presents today without complaints for screening colonoscopy.  He denies any abdominal pain, change in bowel habit, or rectal bleeding.  Denies any family history of colorectal cancer.  Denies any family history of colorectal cancer.     Admits to GL[facility].  Does not use a CPAP.     Denies any cardiac issues.     Denies taking a GLP-1 receptors.     5/19: colonoscopy ([facility]):  normal colon.     5/14: Colonoscopy ([facility]): Transverse- tubular adenoma; Sigmoid- tubular adenoma; diverticulosis; hemorrhoids.     History:  Past Medical History:   Diagnosis Date    Gout     Heart murmur     Hypertension     Shingles     Sleep apnea        Past Surgical History:   Procedure Laterality Date    FINGER SURGERY         Family History   Problem Relation Age of Onset    Lung cancer Mother     Diabetes Sister     Diabetes Brother        Social History     Tobacco Use    Smoking status: Former     Types: Cigarettes     Passive exposure: Past    Smokeless tobacco: Never   Vaping Use    Vaping status: Never Used   Substance Use Topics    Alcohol use: Yes    Drug use: Yes     Types: Marijuana       Review of Systems: See HPI    Review of Systems            Medications Prior to Admission   Medication Sig Dispense Refill Last Dose/Taking    allopurinol (ZYLOPRIM) 100 MG tablet Take 1 tablet by mouth Daily.       amLODIPine (NORVASC) 5 MG tablet        aspirin 81 MG EC tablet Take 1 tablet by mouth Daily.       atorvastatin (LIPITOR) 20 MG tablet Take 1 tablet by mouth Daily. (Patient not taking: Reported on 10/16/2024)       atorvastatin (LIPITOR) 40 MG tablet Take 1 tablet by mouth Daily.  (Patient not taking: Reported on 10/16/2024)       Cetirizine HCl (ZYRTEC PO) Zyrtec       Diclofenac Sodium (VOLTAREN) 1 % gel gel diclofenac 1 % topical gel       ezetimibe (ZETIA) 10 MG tablet        losartan (COZAAR) 25 MG tablet Take 1 tablet by mouth Daily.       losartan (COZAAR) 50 MG tablet Take 1 tablet by mouth Daily. (Patient not taking: Reported on 10/16/2024)       Magnesium 400 MG capsule magnesium 200 mg oral tablet take 1 tablet by oral route daily   Suspended (Patient not taking: Reported on 10/16/2024)       Omeprazole Magnesium (PRILOSEC PO) Prilosec OTC   one tablet as needed       pitavastatin calcium (LIVALO) 1 MG tablet tablet Take 1 tablet by mouth Daily.       POTASSIUM PO potassium 99 mg oral tablet take 1 tablet by oral route daily   Suspended       traZODone (DESYREL) 50 MG tablet Take 1 tablet by mouth Daily.       vitamin E 100 UNIT capsule vitamin E 100 unit oral capsule take 1 capsule by oral route daily   Suspended            Home Meds:      Prior to Admission medications    Medication Sig Start Date End Date Taking? Authorizing Provider   allopurinol (ZYLOPRIM) 100 MG tablet Take 1 tablet by mouth Daily. 4/1/21   Oliver Workman MD   amLODIPine (NORVASC) 5 MG tablet  10/1/24   Oliver Workman MD   aspirin 81 MG EC tablet Take 1 tablet by mouth Daily.    Oliver Workman MD   atorvastatin (LIPITOR) 20 MG tablet Take 1 tablet by mouth Daily.  Patient not taking: Reported on 10/16/2024 7/18/22   Oliver Workman MD   atorvastatin (LIPITOR) 40 MG tablet Take 1 tablet by mouth Daily.  Patient not taking: Reported on 10/16/2024    Oliver Workman MD   Cetirizine HCl (ZYRTEC PO) Zyrtec    Oliver Workman MD   Diclofenac Sodium (VOLTAREN) 1 % gel gel diclofenac 1 % topical gel    Oliver Workman MD   ezetimibe (ZETIA) 10 MG tablet  10/15/24   Oliver Workman MD   losartan (COZAAR) 25 MG tablet Take 1 tablet by mouth Daily.    Provider  MD Oliver   losartan (COZAAR) 50 MG tablet Take 1 tablet by mouth Daily.  Patient not taking: Reported on 10/16/2024    Oliver Workman MD   Magnesium 400 MG capsule magnesium 200 mg oral tablet take 1 tablet by oral route daily   Suspended  Patient not taking: Reported on 10/16/2024    Oliver Workman MD   Omeprazole Magnesium (PRILOSEC PO) Prilosec OTC   one tablet as needed    Oliver Workman MD   pitavastatin calcium (LIVALO) 1 MG tablet tablet Take 1 tablet by mouth Daily. 10/15/24 11/14/24  Oliver Workman MD   POTASSIUM PO potassium 99 mg oral tablet take 1 tablet by oral route daily   Suspended    Oliver Workman MD   traZODone (DESYREL) 50 MG tablet Take 1 tablet by mouth Daily.    Oliver Workman MD   vitamin E 100 UNIT capsule vitamin E 100 unit oral capsule take 1 capsule by oral route daily   Suspended    Oliver Workman MD            Allergies:  Patient has no known allergies.      Objective     Vital Signs        Physical Exam:     Physical Exam    NAD, A/O x 4, normal circulation, normal respiration      Result Review :Labs  Result Review  Imaging  Med Tab  Media    Assessment & Plan     Diagnoses and all orders for this visit:    1. Screening for malignant neoplasm of colon  -     sodium chloride 0.9 % flush 3 mL  -     sodium chloride 0.9 % flush 10 mL  -     sodium chloride 0.9 % infusion 40 mL    2. History of colonic polyps  -     sodium chloride 0.9 % flush 3 mL  -     sodium chloride 0.9 % flush 10 mL  -     sodium chloride 0.9 % infusion 40 mL    Other orders  -     Continuous Pulse Oximetry; Standing  -     POC Glucose Once; Standing  -     Insert Peripheral IV; Standing  -     lactated ringers infusion  -     Continuous Pulse Oximetry  -     POC Glucose Once  -     Insert Peripheral IV  -     Follow Anesthesia Guidelines / Protocol; Standing  -     Verify Bowel Prep Was Successful; Standing  -     Give Tap Water Enema If Bowel Prep  Insufficient; Standing  -     Insert Peripheral IV; Standing  -     Saline Lock & Maintain IV Access; Standing  -     Place Sequential Compression Device; Standing  -     Maintain Sequential Compression Device; Standing  -     Verify NPO Status; Standing  -     Follow Anesthesia Guidelines / Protocol  -     Verify Bowel Prep Was Successful  -     Give Tap Water Enema If Bowel Prep Insufficient  -     Insert Peripheral IV  -     Saline Lock & Maintain IV Access  -     Place Sequential Compression Device  -     Maintain Sequential Compression Device  -     Verify NPO Status           Risks including bleeding, perforation, pain, infection, missed polyp(s). Benefits, and alternatives of Colonoscopy discussed with patient.  All questions answered.  Consent verified.         Matias Hoskins MD  11/26/24 12:27 EST

## 2024-11-29 LAB
CYTO UR: NORMAL
LAB AP CASE REPORT: NORMAL
LAB AP CLINICAL INFORMATION: NORMAL
PATH REPORT.FINAL DX SPEC: NORMAL
PATH REPORT.GROSS SPEC: NORMAL

## 2025-01-08 ENCOUNTER — TELEPHONE (OUTPATIENT)
Dept: SURGERY | Facility: CLINIC | Age: 65
End: 2025-01-08
Payer: COMMERCIAL

## 2025-01-27 ENCOUNTER — OFFICE VISIT (OUTPATIENT)
Dept: SURGERY | Facility: CLINIC | Age: 65
End: 2025-01-27
Payer: COMMERCIAL

## 2025-01-27 VITALS — BODY MASS INDEX: 31.98 KG/M2 | WEIGHT: 211 LBS | HEIGHT: 68 IN

## 2025-01-27 DIAGNOSIS — D36.9 TUBULOVILLOUS ADENOMA: ICD-10-CM

## 2025-01-27 DIAGNOSIS — Z98.890 S/P COLONOSCOPY WITH POLYPECTOMY: Primary | ICD-10-CM

## 2025-01-27 PROCEDURE — 1159F MED LIST DOCD IN RCRD: CPT | Performed by: NURSE PRACTITIONER

## 2025-01-27 PROCEDURE — 1160F RVW MEDS BY RX/DR IN RCRD: CPT | Performed by: NURSE PRACTITIONER

## 2025-01-27 PROCEDURE — 99212 OFFICE O/P EST SF 10 MIN: CPT | Performed by: NURSE PRACTITIONER

## 2025-01-28 NOTE — PROGRESS NOTES
"Chief Complaint: Follow-up    Subjective      Follow-up visit       History of Present Illness  Patric Villalobos is a 64 y.o. male presents to [facility] GENERAL SURGERY for follow-up visit.    Patient presents today for follow-up visit after undergoing a colonoscopy on 11/26/2024 performed by Dr. Matias Hoskins.  Patient was with a tubulovillous adenoma of the sigmoid colon per colonoscopy/pathology.  Resection and retrieval were complete.    Results for orders placed or performed during the hospital encounter of 11/26/24   Tissue Pathology Exam    Collection Time: 11/26/24  2:01 PM    Specimen: Large Intestine, Sigmoid Colon; Polyp   Result Value Ref Range    Case Report       Surgical Pathology Report                         Case: [id number]                                  Authorizing Provider:  Matias Hoskins MD  Collected:           11/26/2024 02:01 PM          Ordering Location:     [facility] Received:            11/27/2024 09:09 AM                                 SUITES                                                                       Pathologist:           Bee Garcia MD                                                     Specimen:    Large Intestine, Sigmoid Colon, SIGMOID COLON POLYP                                        Clinical Information       Screening for malignant neoplasm of colon  History of colonic polyps      Final Diagnosis       Sigmoid colon polyp, biopsy:   - Fragments of tubulovillous adenoma        Gross Description       1. Large Intestine, Sigmoid Colon.  Received in formalin and labeled \" sigmoid colon polyp\" is a 0.7 cm aggregate of tan soft tissue fragments. The specimen is entirely submitted in one cassette.   АННА      Microscopic Description       Microscopic examination performed.         Objective     Past Medical History:   Diagnosis Date    Arthritis     Gout     Heart murmur     Hypertension     Shingles     Sleep " "apnea        Past Surgical History:   Procedure Laterality Date    COLONOSCOPY N/A 11/26/2024    Procedure: COLONOSCOPY WITH COLD SNARE POLYPECTOMY AND CLIPX1;  Surgeon: Matias Hoskins MD;  Location: [facility] ENDOSCOPY;  Service: General;  Laterality: N/A;  COLON POLYP    FINGER SURGERY         Outpatient Medications Marked as Taking for the 1/27/25 encounter (Office Visit) with Sukhjinder AprilBRY   Medication Sig Dispense Refill    allopurinol (ZYLOPRIM) 100 MG tablet Take 1 tablet by mouth Daily.      amLODIPine (NORVASC) 5 MG tablet       aspirin 81 MG EC tablet Take 1 tablet by mouth Daily.      Cetirizine HCl (ZYRTEC PO) Zyrtec      Diclofenac Sodium (VOLTAREN) 1 % gel gel diclofenac 1 % topical gel      ezetimibe (ZETIA) 10 MG tablet       losartan (COZAAR) 25 MG tablet Take 1 tablet by mouth Daily.      Omeprazole Magnesium (PRILOSEC PO) Prilosec OTC   one tablet as needed      POTASSIUM PO potassium 99 mg oral tablet take 1 tablet by oral route daily   Suspended      traZODone (DESYREL) 50 MG tablet Take 1 tablet by mouth Daily.      vitamin E 100 UNIT capsule vitamin E 100 unit oral capsule take 1 capsule by oral route daily   Suspended         No Known Allergies     Family History   Problem Relation Age of Onset    Lung cancer Mother     Diabetes Sister     Diabetes Brother        Social History     Socioeconomic History    Marital status:    Tobacco Use    Smoking status: Former     Types: Cigarettes     Passive exposure: Past    Smokeless tobacco: Never   Vaping Use    Vaping status: Never Used   Substance and Sexual Activity    Alcohol use: Yes    Drug use: Yes     Types: Marijuana    Sexual activity: Defer       Review of Systems   Constitutional:  Negative for chills and fever.   Gastrointestinal:  Negative for abdominal distention, abdominal pain, anal bleeding, blood in stool, constipation, diarrhea and rectal pain.        Vital Signs:   Ht 171.5 cm (67.5\")   Wt 95.7 kg (211 lb)   " BMI 32.56 kg/m²      Physical Exam  Vitals and nursing note reviewed.   Constitutional:       General: He is not in acute distress.     Appearance: Normal appearance. He is not ill-appearing.   HENT:      Head: Normocephalic and atraumatic.   Cardiovascular:      Rate and Rhythm: Normal rate.   Pulmonary:      Effort: Pulmonary effort is normal.      Breath sounds: No stridor.   Abdominal:      Palpations: Abdomen is soft.      Tenderness: There is no guarding.   Musculoskeletal:         General: No deformity. Normal range of motion.   Skin:     General: Skin is warm and dry.      Coloration: Skin is not jaundiced.   Neurological:      General: No focal deficit present.      Mental Status: He is alert and oriented to person, place, and time.   Psychiatric:         Mood and Affect: Mood normal.         Thought Content: Thought content normal.          Result Review :          []  Laboratory  []  Radiology  []  Pathology  []  Microbiology  []  EKG/Telemetry   []  Cardiology/Vascular   []  Old records  Today reviewed Dr. Hoskins's operative and pathology report     Assessment and Plan    Diagnoses and all orders for this visit:    1. S/P colonoscopy with polypectomy (Primary)    2. Tubulovillous adenoma        Follow Up   Return for Rescreen colon in 3 years; follow-up in interim as needed.    Avoid high fat diets    Increase fiber intake    Per AGA guidelines patient will follow-up for colonoscopy surveillance as directed.    Patient was given instructions and counseling regarding his condition or for health maintenance advice. Please see specific information pulled into the AVS if appropriate.     As always, it has been a pleasure to participate in your patient's care. Please call with questions or concerns.

## (undated) DEVICE — SOLIDIFIER LIQLOC PLS 1500CC BT

## (undated) DEVICE — THE SINGLE USE ETRAP – POLYP TRAP IS USED FOR SUCTION RETRIEVAL OF ENDOSCOPICALLY REMOVED POLYPS.: Brand: ETRAP

## (undated) DEVICE — STERILE POLYISOPRENE POWDER-FREE SURGICAL GLOVES: Brand: PROTEXIS

## (undated) DEVICE — TUBING, SUCTION, 1/4" X 10', STRAIGHT: Brand: MEDLINE

## (undated) DEVICE — CONN JET HYDRA H20 AUXILIARY DISP

## (undated) DEVICE — SOL IRR H2O BO 1000ML STRL

## (undated) DEVICE — Device

## (undated) DEVICE — SOL IRRG H2O PL/BG 1000ML STRL

## (undated) DEVICE — LINER SURG CANSTR SXN S/RIGD 1500CC

## (undated) DEVICE — SNAR POLYP CAPTIFLEX XS/OVL 11X2.4MM 240CM 1P/U

## (undated) DEVICE — STERILE POLYISOPRENE POWDER-FREE SURGICAL GLOVES WITH EMOLLIENT COATING: Brand: PROTEXIS

## (undated) DEVICE — Device: Brand: DEFENDO AIR/WATER/SUCTION AND BIOPSY VALVE